# Patient Record
Sex: FEMALE | Race: WHITE
[De-identification: names, ages, dates, MRNs, and addresses within clinical notes are randomized per-mention and may not be internally consistent; named-entity substitution may affect disease eponyms.]

---

## 2023-04-12 ENCOUNTER — HOSPITAL ENCOUNTER (OUTPATIENT)
Dept: HOSPITAL 76 - ED | Age: 43
Discharge: HOME | End: 2023-04-12
Attending: OBSTETRICS & GYNECOLOGY
Payer: COMMERCIAL

## 2023-04-12 ENCOUNTER — HOSPITAL ENCOUNTER (OUTPATIENT)
Dept: HOSPITAL 76 - DI | Age: 43
Discharge: HOME | End: 2023-04-12
Payer: COMMERCIAL

## 2023-04-12 VITALS — SYSTOLIC BLOOD PRESSURE: 140 MMHG | DIASTOLIC BLOOD PRESSURE: 83 MMHG

## 2023-04-12 DIAGNOSIS — N83.8: ICD-10-CM

## 2023-04-12 DIAGNOSIS — Z90.5: ICD-10-CM

## 2023-04-12 DIAGNOSIS — Z32.01: ICD-10-CM

## 2023-04-12 DIAGNOSIS — O00.102: Primary | ICD-10-CM

## 2023-04-12 DIAGNOSIS — Z32.01: Primary | ICD-10-CM

## 2023-04-12 LAB
ANION GAP SERPL CALCULATED.4IONS-SCNC: 6 MMOL/L (ref 6–13)
BASOPHILS NFR BLD AUTO: 0 10^3/UL (ref 0–0.1)
BASOPHILS NFR BLD AUTO: 0.3 %
BUN SERPL-MCNC: 11 MG/DL (ref 6–20)
CALCIUM UR-MCNC: 9.1 MG/DL (ref 8.5–10.3)
CHLORIDE SERPL-SCNC: 106 MMOL/L (ref 101–111)
CLARITY UR REFRACT.AUTO: (no result)
CO2 SERPL-SCNC: 26 MMOL/L (ref 21–32)
CREAT SERPLBLD-SCNC: 0.8 MG/DL (ref 0.4–1)
EOSINOPHIL # BLD AUTO: 0 10^3/UL (ref 0–0.7)
EOSINOPHIL NFR BLD AUTO: 0.1 %
ERYTHROCYTE [DISTWIDTH] IN BLOOD BY AUTOMATED COUNT: 12.7 % (ref 12–15)
GFRSERPLBLD MDRD-ARVRAT: 79 ML/MIN/{1.73_M2} (ref 89–?)
GLUCOSE SERPL-MCNC: 101 MG/DL (ref 70–100)
GLUCOSE UR QL STRIP.AUTO: NEGATIVE MG/DL
HCT VFR BLD AUTO: 41.6 % (ref 37–47)
HGB UR QL STRIP: 13.2 G/DL (ref 12–16)
INR PPP: 1 (ref 0.8–1.2)
KETONES UR QL STRIP.AUTO: 40 MG/DL
LYMPHOCYTES # SPEC AUTO: 2.7 10^3/UL (ref 1.5–3.5)
LYMPHOCYTES NFR BLD AUTO: 31 %
MCH RBC QN AUTO: 28.3 PG (ref 27–31)
MCHC RBC AUTO-ENTMCNC: 31.7 G/DL (ref 32–36)
MCV RBC AUTO: 89.1 FL (ref 81–99)
MONOCYTES # BLD AUTO: 0.7 10^3/UL (ref 0–1)
MONOCYTES NFR BLD AUTO: 7.6 %
MUCOUS THREADS URNS QL MICRO: (no result)
NEUTROPHILS # BLD AUTO: 5.3 10^3/UL (ref 1.5–6.6)
NEUTROPHILS # SNV AUTO: 8.7 X10^3/UL (ref 4.8–10.8)
NEUTROPHILS NFR BLD AUTO: 60.8 %
NITRITE UR QL STRIP.AUTO: NEGATIVE
NRBC # BLD AUTO: 0 /100WBC
NRBC # BLD AUTO: 0 X10^3/UL
PDW BLD AUTO: 9.1 FL (ref 7.9–10.8)
PH UR STRIP.AUTO: 7 PH (ref 5–7.5)
PLATELET # BLD: 310 10^3/UL (ref 130–450)
POTASSIUM SERPL-SCNC: 3.1 MMOL/L (ref 3.5–5)
PROT UR STRIP.AUTO-MCNC: (no result) MG/DL
PROTHROM ACT/NOR PPP: 11.2 SECS (ref 9.9–12.6)
RBC # UR STRIP.AUTO: (no result) /UL
RBC # URNS HPF: (no result) /HPF (ref 0–5)
RBC MAR: 4.67 10^6/UL (ref 4.2–5.4)
SODIUM SERPLBLD-SCNC: 138 MMOL/L (ref 135–145)
SP GR UR STRIP.AUTO: 1.01 (ref 1–1.03)
SQUAMOUS URNS QL MICRO: (no result)
UROBILINOGEN UR QL STRIP.AUTO: (no result) E.U./DL
UROBILINOGEN UR STRIP.AUTO-MCNC: NEGATIVE MG/DL
WBC # UR MANUAL: (no result) /HPF (ref 0–5)

## 2023-04-12 PROCEDURE — 84702 CHORIONIC GONADOTROPIN TEST: CPT

## 2023-04-12 PROCEDURE — 36415 COLL VENOUS BLD VENIPUNCTURE: CPT

## 2023-04-12 PROCEDURE — 99285 EMERGENCY DEPT VISIT HI MDM: CPT

## 2023-04-12 PROCEDURE — 80048 BASIC METABOLIC PNL TOTAL CA: CPT

## 2023-04-12 PROCEDURE — 81001 URINALYSIS AUTO W/SCOPE: CPT

## 2023-04-12 PROCEDURE — 76830 TRANSVAGINAL US NON-OB: CPT

## 2023-04-12 PROCEDURE — 85610 PROTHROMBIN TIME: CPT

## 2023-04-12 PROCEDURE — 0UT68ZZ RESECTION OF LEFT FALLOPIAN TUBE, VIA NATURAL OR ARTIFICIAL OPENING ENDOSCOPIC: ICD-10-PCS | Performed by: OBSTETRICS & GYNECOLOGY

## 2023-04-12 PROCEDURE — 87086 URINE CULTURE/COLONY COUNT: CPT

## 2023-04-12 PROCEDURE — 10T28ZZ RESECTION OF PRODUCTS OF CONCEPTION, ECTOPIC, VIA NATURAL OR ARTIFICIAL OPENING ENDOSCOPIC: ICD-10-PCS | Performed by: OBSTETRICS & GYNECOLOGY

## 2023-04-12 PROCEDURE — 86850 RBC ANTIBODY SCREEN: CPT

## 2023-04-12 PROCEDURE — 86901 BLOOD TYPING SEROLOGIC RH(D): CPT

## 2023-04-12 PROCEDURE — 85025 COMPLETE CBC W/AUTO DIFF WBC: CPT

## 2023-04-12 PROCEDURE — 99284 EMERGENCY DEPT VISIT MOD MDM: CPT

## 2023-04-12 PROCEDURE — 86900 BLOOD TYPING SEROLOGIC ABO: CPT

## 2023-04-12 PROCEDURE — 59151 TREAT ECTOPIC PREGNANCY: CPT

## 2023-04-12 PROCEDURE — 81003 URINALYSIS AUTO W/O SCOPE: CPT

## 2023-04-12 NOTE — DISCHARGE PLAN
Discharge Plan


Problem Reviewed?: Yes


Disposition: 01 Home, Self Care


Condition: Stable


Diet: Regular


Activity Restrictions: Activity as Tolerated


Shower Restrictions: No


Driving Restrictions: Yes


Weight Bearing: Partial Weight


No Smoking: If you smoke, Please STOP!  Call [phone] for help.


Follow-up with: 


COOPER NAYLOR MD [Primary Care Provider] - 


Iam Tejada MD [Provider Admit Priv/Credential] -

## 2023-04-12 NOTE — ED PHYSICIAN DOCUMENTATION
PD HPI ABD PAIN





- Stated complaint


Stated Complaint: ABD PX/SPOTTING





- Chief complaint


Chief Complaint: Abd Pain





- History obtained from


History obtained from: Patient, Other (Dr Tejada spoke with me PTA)





- Additional information


Additional information: 





G4 now  with 4 C-sections in the past went to the clinic today for left 

pelvic pain.  She was found to be pregnant with a beta-hCG of 333 and an 

ultrasound concerning for ruptured ectopic pregnancy and was referred to the 

emergency department to be sent for the OR.  Last oral intake 7 AM.





PD PAST MEDICAL HISTORY





- Present Medications


Home Medications: 


                                Ambulatory Orders











 Medication  Instructions  Recorded  Confirmed


 


Acetaminophen [Acetaminophen Extra 1,000 mg PO Q8H PRN #60 tablet 23 





Strength]   


 


Ibuprofen [Motrin] 600 mg PO Q6H PRN #30 tab 23 














- Allergies


Allergies/Adverse Reactions: 


                                    Allergies











Allergy/AdvReac Type Severity Reaction Status Date / Time


 


aspirin [From Percodan] Allergy  Anaphylaxis Verified 23 16:31


 


hydrocodone [From Vicodin] Allergy  Itching Verified 23 16:31


 


hydromorphone [From Dilaudid] Allergy  Anaphylaxis Verified 23 16:31


 


morphine Allergy  Anaphylaxis Verified 23 16:31


 


oxycodone [From Percocet] Allergy  Anaphylaxis Verified 23 16:31


 


tramadol [From Ultram] Allergy  Unknown Verified 23 16:31














PD ED PE NORMAL





- Vitals


Vital signs reviewed: Yes





- General


General: Alert and oriented X 3, No acute distress





- Abdomen


Abdomen: Normal bowel sounds, Soft, Non tender





- Back


Back: No CVA TTP, No spinal TTP





- Derm


Derm: Normal color, Warm and dry





- Extremities


Extremities: No edema, No calf tenderness / cord





- Neuro


Neuro: Alert and oriented X 3, Normal speech





Results





- Vitals


Vitals: 


                               Vital Signs - 24 hr











  23





  16:24 16:51 17:35


 


Temperature 36.9 C  


 


Heart Rate 84 75 72


 


Heart Rate [   





Monitoring   





electrodes]   


 


Respiratory 16 15 15





Rate   


 


Blood Pressure 144/97 H 149/94 H 132/84 H


 


Blood Pressure   





[Left Brachial   





artery]   


 


O2 Saturation 99 100 100














  23





  18:03 19:35 19:40


 


Temperature  36.5 C 


 


Heart Rate  98 91


 


Heart Rate [   





Monitoring   





electrodes]   


 


Respiratory 16 20 22





Rate   


 


Blood Pressure  135/99 H 145/97 H


 


Blood Pressure   





[Left Brachial   





artery]   


 


O2 Saturation  99 100














  23





  19:45 19:50 20:00


 


Temperature   36.7 C


 


Heart Rate 80 82 69


 


Heart Rate [   





Monitoring   





electrodes]   


 


Respiratory 19 22 14





Rate   


 


Blood Pressure 155/93 H 152/91 H 146/91 H


 


Blood Pressure   





[Left Brachial   





artery]   


 


O2 Saturation 100 100 100














  23





  20:46


 


Temperature 36.4 C L


 


Heart Rate 


 


Heart Rate [ 67





Monitoring 





electrodes] 


 


Respiratory 16





Rate 


 


Blood Pressure 


 


Blood Pressure 140/83 H





[Left Brachial 





artery] 


 


O2 Saturation 98








                                     Oxygen











O2 Source                      Room air

















- Labs


Labs: 


                                Laboratory Tests











  23





  16:38 16:40 16:40


 


WBC   8.7 


 


RBC   4.67 


 


Hgb   13.2 


 


Hct   41.6 


 


MCV   89.1 


 


MCH   28.3 


 


MCHC   31.7 L 


 


RDW   12.7 


 


Plt Count   310 


 


MPV   9.1 


 


Neut # (Auto)   5.3 


 


Lymph # (Auto)   2.7 


 


Mono # (Auto)   0.7 


 


Eos # (Auto)   0.0 


 


Baso # (Auto)   0.0 


 


Absolute Nucleated RBC   0.00 


 


Nucleated RBC %   0.0 


 


PT    11.2


 


INR    1.0


 


Sodium   


 


Potassium   


 


Chloride   


 


Carbon Dioxide   


 


Anion Gap   


 


BUN   


 


Creatinine   


 


Estimated GFR (MDRD)   


 


Glucose   


 


Calcium   


 


Urine Color  DARK YELLOW  


 


Urine Clarity  HAZY  


 


Urine pH  7.0  


 


Ur Specific Gravity  1.015  


 


Urine Protein  TRACE  


 


Urine Glucose (UA)  NEGATIVE  


 


Urine Ketones  40 H  


 


Urine Occult Blood  LARGE H  


 


Urine Nitrite  NEGATIVE  


 


Urine Bilirubin  NEGATIVE  


 


Urine Urobilinogen  0.2 (NORMAL)  


 


Ur Leukocyte Esterase  NEGATIVE  


 


Urine RBC  TNTC H  


 


Urine WBC  0-3  


 


Ur Squamous Epith Cells  FEW Squamous  


 


Urine Bacteria  Moderate H  


 


Urine Mucus  Few Strands  


 


Ur Microscopic Review  INDICATED  


 


Urine Culture Comments  NOT INDICATED  


 


Blood Type   


 


Antibody Screen   














  23





  16:40 17:00


 


WBC  


 


RBC  


 


Hgb  


 


Hct  


 


MCV  


 


MCH  


 


MCHC  


 


RDW  


 


Plt Count  


 


MPV  


 


Neut # (Auto)  


 


Lymph # (Auto)  


 


Mono # (Auto)  


 


Eos # (Auto)  


 


Baso # (Auto)  


 


Absolute Nucleated RBC  


 


Nucleated RBC %  


 


PT  


 


INR  


 


Sodium  138 


 


Potassium  3.1 L 


 


Chloride  106 


 


Carbon Dioxide  26 


 


Anion Gap  6.0 


 


BUN  11 


 


Creatinine  0.8 


 


Estimated GFR (MDRD)  79 L 


 


Glucose  101 H 


 


Calcium  9.1 


 


Urine Color  


 


Urine Clarity  


 


Urine pH  


 


Ur Specific Gravity  


 


Urine Protein  


 


Urine Glucose (UA)  


 


Urine Ketones  


 


Urine Occult Blood  


 


Urine Nitrite  


 


Urine Bilirubin  


 


Urine Urobilinogen  


 


Ur Leukocyte Esterase  


 


Urine RBC  


 


Urine WBC  


 


Ur Squamous Epith Cells  


 


Urine Bacteria  


 


Urine Mucus  


 


Ur Microscopic Review  


 


Urine Culture Comments  


 


Blood Type   O NEGATIVE


 


Antibody Screen   NEGATIVE














PD Medical Decision Making





- ED course


ED course: 





42-year-old woman with apparent ruptured ectopic pregnancy already worked up as 

an outpatient earlier today presents for operative management and I spoke with 

the on-call OB after initial evaluation at 4:36 PM.


Discussed Rh_ status with OB, who feels rhogam not necessary as pt now infertile

d/t dx/tx.





Departure





- Departure


Disposition: ED Transfer to [facility]


Clinical Impression: 


 Ruptured ectopic pregnancy





Condition: Stable


Discharge Date/Time: 23 18:05

## 2023-04-12 NOTE — OPERATIVE REPORT
Operative Report





- General


Procedure Date: 23


Planned Procedure: 





Laparoscopic left salpingectomy


Pre-Op Diagnosis: Left tubal ectopic pregnancy


Procedure Performed: 





Laparoscopic left salpingectomy


Post Op Diagnosis: Left tubal ectopic pregnancy unruptured





- Procedure Note


Primary Surgeon: 


Anesthesia Provider: CRNA


Anesthesia Technique: General ET tube


Pathology: 





Left tube with products of conception


Estimated Blood Loss (mL): 10


Indications: 





Left tubal ectopic pregnancy


Findings: 





Normal external genitalia, normal size of uterus, left unruptured tubal ectopic 

pregnancy, left normal ovary, right ovary surgically absent tube was seen


Complications: 


None 








- Other


Other Information/Narrative: 





Normal looking appendix and normal edge of liver and minimal free blood in the 

pelvis


Date of surgery:2023


Surgeon: Dr. CLANCY


Preoperative diagnosis: Left tubal ectopic pregnancy


Postoperative diagnosis: The same 


Procedure performed: Laparoscopy left salpingectomy 








Anesthesia: General


Estimated blood loss:10 mm


Findings: Appearance of external genitalia normal appearance of vagina normal 

appearance of cervix normal uterine size 8 centimeter mid position mobile  

adnexal masses:Not well palpated


The uterus was slightly enlarged 


The left adnexa was not well visualized due to this omental adhesions and when 

it was visualized after dissection of the adhesion the left tube showed intra 

tubal ectopic pregnancy in the ampulla portion and ovary was  severely covered 

by the adhesion.  Right tube and ovary were not shown


There was a mild hemoperitoneum


Specimen: Products of conception with left tube 


Complications: None


Disposition: Stable to PACU    





Indications for procedure


History: This is a 42-year-old  5 para 4  ectopic 1 who presented to the 

emergency department with abnormal ultrasound finding and ledt abdominal pain.  

Quantitative beta-hCG was performed and noted to be 333.  On ultrasound the 

following findings were noted: No intrauterine gestational sac was noted and 

there was a complex left adnexal mass.  Small amount of free fluid was noted in 

the cul-de-sac.  The patient was noted to be hemodynamically stable.  


Surgical risk: The patient was informed of the risks and benefits of a 

diagnostic laparoscopy.  The risks included, but were not limited to, bleeding, 

infection, injury to internal organs, possible blood transfusion, possible 

hysterectomy, and possible oophorectomy.  The patient was  counseled on the 

potential loss of fertility following unilateral salpingectomy.  The patient's w

as counseled on possible laparotomy and all other indicated procedures.  The 

patient expressed understanding of the risks involved, all questions were 

answered, and the patient consented to the procedure.                           

                                                                         


        


Description of the procedure





The patient was taken to the operating room where a timeout was performed to 

confirm correct patient and correct procedure.  The patient was given 

preoperative prophylactic intravenous antibiotics.  General anesthesia was 

established.  The patient was then positioned on the operating table in the 

dorsal lithotomy position with the legs supported using stirrups.  All pressure 

points were padded and a Christiane hugger was placed to maintain control of core body

temperature.  The patient was then prepped and draped in the usual sterile 

fashion.  A bimanual exam was performed and the uterus was found to be slightly 

increased in size, midline, and mobile.  


Operative technique:   The speculum was inserted and anterior lip of the cervix 

was grasped with a single-tooth tenaculum.  No active bleeding was noted.  The 

uterine cavity was measured 8 cm. A uterine  manipulator was placed through the 

cervix into the uterus for uterine manipulation.  The speculum was then removed 

from the vagina. Good hemostasis was noted.


Attention was turned to the abdomen where a 5 mm vertical incision was made in 

the umbilical region and a 5 mm trocar was introduced under direct visualization

with the laparoscope within the trocar.   A 5 mm trocar was introduced after 

achievement of pneumoperitoneum.  The laparoscope was then placed through the 

trocar and diagnostic laparoscopy was performed.


The findings was as above described. A 5 mm horizontal incision was made in the 

left lower quadrant after injecting quarter percent of Marcaine.  A 5 mm trocar 

was introduced under direct visualization.  The left fallopian tube was noted to

be grossly swollen with an ectopic pregnancy.  This was well visualized after 

dissecting the omental adhesion on the posterior of the uterus.  A 10 mm 

incision was then made in the left lower quadrant after injecting the local anes

thetics .  A 10 mm trocar was then introduced under direct visualization.


The fallopian tube was grasped at the fimbriated end using atraumatic graspers 

and the LigaSure was used to coagulate and cut the fallopian tube proximal to 

the ectopic.  The fallopian tube was then coagulated and cut distal to the 

ectopic pregnancy.  The mesosalpinx was coagulated and cut using the LigaSure.  

The portion of the fallopian tube containing products of conception was then 

placed into an Endobag, removed, and sent to pathology.    Attention was turned 

to the liver and liver edge was normal.  


Free blood was suctioned from the cul-de-sac.  The pelvis was thoroughly 

irrigated and good hemostasis was noted.


The 10 mm port was removed and the fascial incision was closed using 2-0 Vicryl 

in interrupted sutures and the pneumoperitoneum was evacuated and the remaining 

ports were removed.  The skin was reapproximated using Dermabond.  Attention was

then turned to the perineum.  The uterine manipulator was removed and hemostasis

was confirmed.  All sponges, needle, and instrument counts were noted to be 

correct x2 at the end of the procedure.  The patient tolerated the procedure 

well and was transferred to the recovery room in stable condition.

## 2023-04-12 NOTE — ULTRASOUND REPORT
PROCEDURE:  Transvaginal

 

INDICATIONS:  PELVIC PAIN LEFT

 

TECHNIQUE:  

Real-time endovaginal scanning was performed of the pelvic organs, with image documentation.  

 

COMPARISON:  None.

 

FINDINGS:  

No pathologic free abdominal or pelvic fluid.  

 

Uterus:  Uterus is normal in size at 7.5 x 4.6 x 5.3 cm.  The endometrium measures 5 mm in combined t
hickness. There is no intrauterine gestation seen. No endometrial mass or fluid.

 

Ovaries:  Right ovary is surgically absent. No right adnexal mass is seen. Heterogeneously hypoechoic
 structure is noted in left ovary and show internal vascularity.

 

Other: Small amount of free fluid is seen in left adnexa extending to posterior cul-de-sac.

 

IMPRESSION:  

1. No evidence of intrauterine gestation.

2. Complex mass in left ovary with internal blood flow and adjacent free fluid concerning for ectopic
 gestation. Correlation with serial beta-hCG levels and follow-up ultrasound is recommended.

3. Prior right nephrectomy. No abnormality is seen in right adnexa.

 

Reviewed by: Dimitry Jefferson MD on 4/12/2023 11:50 AM PDT

Approved by: Dimitry Jefferson MD on 4/12/2023 11:50 AM PDT

 

 

Station ID:  [id number]

## 2023-04-12 NOTE — HISTORY & PHYSICAL EXAMINATION
HPI





- Admitted From


Admitted from: ED





- History Obtained From


History obtained from: Patient


Exam limitations: No limitations





- History of Present Illness


Severity at the worst: reports: Moderate


Pain Quality: reports: Throbbing


Context-Pain started w/: reports: Movement, Palpation


Timing: reports: Gradual onset


Duration: reports: Days:


Improved with: reports: Nothing


Worsened by: reports: Movement


HPI Comment/Other: 


This 42-year-old  5 para 4 AB 1 was  seen at the clinic for the 

evaluation of left lower quadrant pain.  hCG titer was 333 and pelvic ultrasound

showed complex echogenic structure on the left side adnexal area.  She was 

having bleeding also.  Examination revealed consistent with left tubal ectopic 

pregnancy and discussed about the medical treatment with  methotrexate  versus 

laparoscopic procedure.  We agreed to proceed with laparoscopic examination.  

She and her  were well-informed the pros and cons and they understood and

consented for the procedure.








PMH/PSH





- Past Medical History


Cardiovascular: positive: None


Respiratory: positive: None


Neuro: positive: None


Endocrine/Autoimmune: positive: None


GI: positive: None


GYN: positive: None


: positive: None


HEENT: positive: None


Psych: positive: None


Musculoskeletal: positive: None


Derm: positive: None


MRSA Hx?: No





- Past Surgical History


Ortho: positive: Other


/GYN: positive:  section, Oophrectomy





Social & Family Hx





- Social History


Does the pt smoke?: No


Smoking Status: Never smoker


Does the pt drink ETOH?: Yes


Does the pt have substance abuse?: No





- POLST


Patient has POLST: No





Meds/Allgy





- Home Medications


Home Medications: 


                                Ambulatory Orders











 Medication  Instructions  Recorded  Confirmed


 


No Known Home Medications  23














- Allergies


Allergies/Adverse Reactions: 


                                    Allergies











Allergy/AdvReac Type Severity Reaction Status Date / Time


 


acetaminophen [From Percocet] Allergy  Anaphylaxis Verified 23 16:31


 


aspirin [From Percodan] Allergy  Anaphylaxis Verified 23 16:31


 


hydrocodone [From Vicodin] Allergy  Itching Verified 23 16:31


 


hydromorphone [From Dilaudid] Allergy  Anaphylaxis Verified 23 16:31


 


morphine Allergy  Anaphylaxis Verified 23 16:31


 


oxycodone [From Percocet] Allergy  Anaphylaxis Verified 23 16:31


 


tramadol [From Ultram] Allergy  Unknown Verified 23 16:31














Review of Systems





- Gastrointestinal


Gastrointestinal: reports: Abdominal pain





- All Other Systems


All Other Systems: reports: Reviewed and negative





Exam





- Vital Signs


Reviewed Vital Signs: Yes


Vital Signs: 





                                Vital Signs x48h











  Temp Pulse Resp BP Pulse Ox


 


 23 16:51   75  15  149/94 H  100


 


 23 16:24  98.4 F  84  16  144/97 H  99














- Physical Exam


General Appearance: positive: Mild distress


Eyes Bilateral: positive: Normal inspection


ENT: positive: ENT inspection nml


Neck: positive: Nml inspection


Abdomen: positive: Tenderness





Results





- Lab Results


Fish Bones: 


                                 23 16:40





                                 23 16:40


Other Lab Results: 





                               Lab Results x24hrs











  23 Range/Units





  16:40 16:40 16:40 


 


WBC    8.7  (4.8-10.8)  x10^3/uL


 


RBC    4.67  (4.20-5.40)  10^6/uL


 


Hgb    13.2  (12.0-16.0)  g/dL


 


Hct    41.6  (37.0-47.0)  %


 


MCV    89.1  (81.0-99.0)  fL


 


MCH    28.3  (27.0-31.0)  pg


 


MCHC    31.7 L  (32.0-36.0)  g/dL


 


RDW    12.7  (12.0-15.0)  %


 


Plt Count    310  (130-450)  10^3/uL


 


MPV    9.1  (7.9-10.8)  fL


 


Neut # (Auto)    5.3  (1.5-6.6)  10^3/uL


 


Lymph # (Auto)    2.7  (1.5-3.5)  10^3/uL


 


Mono # (Auto)    0.7  (0.0-1.0)  10^3/uL


 


Eos # (Auto)    0.0  (0.0-0.7)  10^3/uL


 


Baso # (Auto)    0.0  (0.0-0.1)  10^3/uL


 


Absolute Nucleated RBC    0.00  x10^3/uL


 


Nucleated RBC %    0.0  /100WBC


 


PT   11.2   (9.9-12.6)  secs


 


INR   1.0   (0.8-1.2)  


 


Sodium  138    (135-145)  mmol/L


 


Potassium  3.1 L    (3.5-5.0)  mmol/L


 


Chloride  106    (101-111)  mmol/L


 


Carbon Dioxide  26    (21-32)  mmol/L


 


Anion Gap  6.0    (6-13)  


 


BUN  11    (6-20)  mg/dL


 


Creatinine  0.8    (0.4-1.0)  mg/dL


 


Estimated GFR (MDRD)  79 L    (>89)  


 


Glucose  101 H    ()  mg/dL


 


Calcium  9.1    (8.5-10.3)  mg/dL


 


Urine Color     


 


Urine Clarity     (CLEAR)  


 


Urine pH     (5.0-7.5)  PH


 


Ur Specific Gravity     (1.002-1.030)  


 


Urine Protein     (NEGATIVE)  mg/dL


 


Urine Glucose (UA)     (NEGATIVE)  mg/dL


 


Urine Ketones     (NEGATIVE)  mg/dL


 


Urine Occult Blood     (NEGATIVE)  


 


Urine Nitrite     (NEGATIVE)  


 


Urine Bilirubin     (NEGATIVE)  


 


Urine Urobilinogen     (NORMAL)  E.U./dL


 


Ur Leukocyte Esterase     (NEGATIVE)  


 


Urine RBC     (0-5)  /HPF


 


Urine WBC     (0-5)  /HPF


 


Ur Squamous Epith Cells     (<= Few)  


 


Urine Bacteria     (None Seen)  /HPF


 


Urine Mucus     


 


Ur Microscopic Review     


 


Urine Culture Comments     














  23 Range/Units





  16:38 


 


WBC   (4.8-10.8)  x10^3/uL


 


RBC   (4.20-5.40)  10^6/uL


 


Hgb   (12.0-16.0)  g/dL


 


Hct   (37.0-47.0)  %


 


MCV   (81.0-99.0)  fL


 


MCH   (27.0-31.0)  pg


 


MCHC   (32.0-36.0)  g/dL


 


RDW   (12.0-15.0)  %


 


Plt Count   (130-450)  10^3/uL


 


MPV   (7.9-10.8)  fL


 


Neut # (Auto)   (1.5-6.6)  10^3/uL


 


Lymph # (Auto)   (1.5-3.5)  10^3/uL


 


Mono # (Auto)   (0.0-1.0)  10^3/uL


 


Eos # (Auto)   (0.0-0.7)  10^3/uL


 


Baso # (Auto)   (0.0-0.1)  10^3/uL


 


Absolute Nucleated RBC   x10^3/uL


 


Nucleated RBC %   /100WBC


 


PT   (9.9-12.6)  secs


 


INR   (0.8-1.2)  


 


Sodium   (135-145)  mmol/L


 


Potassium   (3.5-5.0)  mmol/L


 


Chloride   (101-111)  mmol/L


 


Carbon Dioxide   (21-32)  mmol/L


 


Anion Gap   (6-13)  


 


BUN   (6-20)  mg/dL


 


Creatinine   (0.4-1.0)  mg/dL


 


Estimated GFR (MDRD)   (>89)  


 


Glucose   ()  mg/dL


 


Calcium   (8.5-10.3)  mg/dL


 


Urine Color  DARK YELLOW  


 


Urine Clarity  HAZY  (CLEAR)  


 


Urine pH  7.0  (5.0-7.5)  PH


 


Ur Specific Gravity  1.015  (1.002-1.030)  


 


Urine Protein  TRACE  (NEGATIVE)  mg/dL


 


Urine Glucose (UA)  NEGATIVE  (NEGATIVE)  mg/dL


 


Urine Ketones  40 H  (NEGATIVE)  mg/dL


 


Urine Occult Blood  LARGE H  (NEGATIVE)  


 


Urine Nitrite  NEGATIVE  (NEGATIVE)  


 


Urine Bilirubin  NEGATIVE  (NEGATIVE)  


 


Urine Urobilinogen  0.2 (NORMAL)  (NORMAL)  E.U./dL


 


Ur Leukocyte Esterase  NEGATIVE  (NEGATIVE)  


 


Urine RBC  TNTC H  (0-5)  /HPF


 


Urine WBC  0-3  (0-5)  /HPF


 


Ur Squamous Epith Cells  FEW Squamous  (<= Few)  


 


Urine Bacteria  Moderate H  (None Seen)  /HPF


 


Urine Mucus  Few Strands  


 


Ur Microscopic Review  INDICATED  


 


Urine Culture Comments  NOT INDICATED  














Impression/Plan





- Problem List


Problem List: 





Impression: Left tubal ectopic pregnancy


Plan laparoscopic examination and left salpingectomy and possible dilation and 

curettage

## 2023-04-12 NOTE — ANESTHESIA
Pre-Anesthesia VS, & Labs





- Diagnosis





ectopic pregnancy





- Procedure





laparoscopic treatment of ectopic pregnancy


Vital Signs: 





                                        











Temp Pulse Resp BP Pulse Ox O2 Flow Rate


 


 36.9 C   75   15   149/94 H  100    


 


 23 16:24  23 16:51  23 16:51  23 16:51  23 16:51 

 











Height: 5 ft 5 in


Weight (kg): 67.585 kg


Body Mass Index: 24.7


BMI Classification: Normal





- NPO


>8 hours





- Pregnancy


Is Patient Pregnant?: Yes





- Lab Results


Current Lab Results: 





Laboratory Tests





23 16:40: Sodium 138, Potassium 3.1 L, Chloride 106, Carbon Dioxide 26, 

Anion Gap 6.0, BUN 11, Creatinine 0.8, Estimated GFR (MDRD) 79 L, Glucose 101 H,

Calcium 9.1


23 16:40: PT 11.2, INR 1.0


23 16:40: WBC 8.7, RBC 4.67, Hgb 13.2, Hct 41.6, MCV 89.1, MCH 28.3, MCHC 

31.7 L, RDW 12.7, Plt Count 310, MPV 9.1, Neut # (Auto) 5.3, Lymph # (Auto) 2.7,

Mono # (Auto) 0.7, Eos # (Auto) 0.0, Baso # (Auto) 0.0, Absolute Nucleated RBC 

0.00, Nucleated RBC % 0.0








Fish Bones: 


                                 23 16:40





                                 23 16:40





Home Medications and Allergies


Home Medications: 


Ambulatory Orders





No Known Home Medications  23 











                                        





No Known Home Medications  23 








Allergies/Adverse Reactions: 


                                    Allergies











Allergy/AdvReac Type Severity Reaction Status Date / Time


 


acetaminophen [From Percocet] Allergy  Anaphylaxis Verified 23 16:31


 


aspirin [From Percodan] Allergy  Anaphylaxis Verified 23 16:31


 


hydrocodone [From Vicodin] Allergy  Itching Verified 23 16:31


 


hydromorphone [From Dilaudid] Allergy  Anaphylaxis Verified 23 16:31


 


morphine Allergy  Anaphylaxis Verified 23 16:31


 


oxycodone [From Percocet] Allergy  Anaphylaxis Verified 23 16:31


 


tramadol [From Ultram] Allergy  Unknown Verified 23 16:31














Anes History & Medical History





- Anesthetic History


Anesthesia Complications: reports: No previous complications





- Medical History


Cardiovascular: reports: None


Pulmonary: reports: None


Gastrointestinal: reports: None


Urinary: reports: None


Neuro: reports: None


Musculoskeletal: reports: None


Endocrine/Autoimmune: reports: None


Blood Disorders: reports: None


Skin: reports: None


Smoking Status: Never smoker





- Surgical History


Gynecologic: reports:  section, Oophrectomy


Orthopedic: reports: Other





Exam


General: Alert, Oriented x3


Dental: WNL


Mouth Opening: Greater than 4 Fingerbreadths


Neck Mobility: Normal


Mallampati classification: II


Respiratory: Lungs clear


Cardiovascular: Regular rate





Plan


Anesthesia Type: General


Consent for Procedure(s) Verified and Reviewed: Yes


Code Status: Attempt Resuscitation


ASA classification: 2-Mild systemic disease


Is this case an emergency?: Yes

## 2023-04-12 NOTE — ANESTHESIA POST OP EVALUATION
Anesthesia Post Eval





- Post Anesthesia Eval


Vitals: 





                                Last Vital Signs











Temp  36.5 C   04/12/23 19:35


 


Pulse  82   04/12/23 19:50


 


Resp  22   04/12/23 19:50


 


BP  152/91 H  04/12/23 19:50


 


Pulse Ox  100   04/12/23 19:50


 


O2 Flow Rate      











CV Function Including HR & BP: Stable


Pain Control: Satisfactory


Nausea & Vomiting: Negative


Mental Status: Baseline


Respiratory Status: Airway Patent


Hydration Status: Satisfactory


Anesthesia Complications: None